# Patient Record
Sex: MALE | URBAN - METROPOLITAN AREA
[De-identification: names, ages, dates, MRNs, and addresses within clinical notes are randomized per-mention and may not be internally consistent; named-entity substitution may affect disease eponyms.]

---

## 2017-01-30 ENCOUNTER — AMBULATORY - HEALTHEAST (OUTPATIENT)
Dept: NEUROSURGERY | Facility: CLINIC | Age: 57
End: 2017-01-30

## 2017-01-30 ENCOUNTER — OFFICE VISIT - HEALTHEAST (OUTPATIENT)
Dept: NEUROSURGERY | Facility: CLINIC | Age: 57
End: 2017-01-30

## 2017-01-30 DIAGNOSIS — G95.9 MYELOPATHY (H): ICD-10-CM

## 2017-01-30 RX ORDER — BACLOFEN 10 MG/1
10 TABLET ORAL 3 TIMES DAILY
Status: SHIPPED | COMMUNITY
Start: 2017-01-30

## 2017-01-30 ASSESSMENT — MIFFLIN-ST. JEOR: SCORE: 2162.51

## 2017-02-16 ENCOUNTER — HOSPITAL ENCOUNTER (OUTPATIENT)
Dept: MRI IMAGING | Facility: CLINIC | Age: 57
Discharge: HOME | End: 2017-02-16
Attending: SURGERY

## 2017-02-16 DIAGNOSIS — G95.9 MYELOPATHY (H): ICD-10-CM

## 2017-03-17 ENCOUNTER — COMMUNICATION - HEALTHEAST (OUTPATIENT)
Dept: NEUROSURGERY | Facility: CLINIC | Age: 57
End: 2017-03-17

## 2017-03-28 ENCOUNTER — OFFICE VISIT - HEALTHEAST (OUTPATIENT)
Dept: NEUROSURGERY | Facility: CLINIC | Age: 57
End: 2017-03-28

## 2017-03-28 DIAGNOSIS — R29.898 WEAKNESS OF BOTH LOWER EXTREMITIES: ICD-10-CM

## 2017-03-28 RX ORDER — GABAPENTIN 600 MG/1
600 TABLET ORAL 3 TIMES DAILY
Status: SHIPPED | COMMUNITY
Start: 2017-03-28

## 2017-04-06 ENCOUNTER — AMBULATORY - HEALTHEAST (OUTPATIENT)
Dept: CARDIOLOGY | Facility: CLINIC | Age: 57
End: 2017-04-06

## 2017-04-06 DIAGNOSIS — M48.061 LUMBAR STENOSIS: ICD-10-CM

## 2017-04-21 ENCOUNTER — HOSPITAL ENCOUNTER (OUTPATIENT)
Dept: MRI IMAGING | Facility: CLINIC | Age: 57
Discharge: HOME | End: 2017-04-21
Attending: NEUROLOGICAL SURGERY

## 2017-04-21 ENCOUNTER — HOSPITAL ENCOUNTER (OUTPATIENT)
Dept: NEUROLOGY | Facility: CLINIC | Age: 57
Discharge: HOME | End: 2017-04-21
Attending: NEUROLOGICAL SURGERY

## 2017-04-21 DIAGNOSIS — M62.81 MUSCLE WEAKNESS (GENERALIZED): ICD-10-CM

## 2017-04-21 DIAGNOSIS — R29.898 WEAKNESS OF BOTH LOWER EXTREMITIES: ICD-10-CM

## 2017-07-05 ENCOUNTER — OFFICE VISIT - HEALTHEAST (OUTPATIENT)
Dept: NEUROSURGERY | Facility: CLINIC | Age: 57
End: 2017-07-05

## 2017-07-05 DIAGNOSIS — M48.061 LUMBAR STENOSIS: ICD-10-CM

## 2017-08-22 ENCOUNTER — OFFICE VISIT - HEALTHEAST (OUTPATIENT)
Dept: NEUROSURGERY | Facility: CLINIC | Age: 57
End: 2017-08-22

## 2017-08-22 DIAGNOSIS — M48.061 LUMBAR STENOSIS: ICD-10-CM

## 2017-09-07 ENCOUNTER — AMBULATORY - HEALTHEAST (OUTPATIENT)
Dept: NEUROSURGERY | Facility: CLINIC | Age: 57
End: 2017-09-07

## 2017-09-07 ENCOUNTER — COMMUNICATION - HEALTHEAST (OUTPATIENT)
Dept: NEUROSURGERY | Facility: CLINIC | Age: 57
End: 2017-09-07

## 2017-09-07 ENCOUNTER — RECORDS - HEALTHEAST (OUTPATIENT)
Dept: ADMINISTRATIVE | Facility: OTHER | Age: 57
End: 2017-09-07

## 2017-09-07 DIAGNOSIS — Z01.818 PRE-OP EVALUATION: ICD-10-CM

## 2017-09-08 ENCOUNTER — RECORDS - HEALTHEAST (OUTPATIENT)
Dept: ADMINISTRATIVE | Facility: OTHER | Age: 57
End: 2017-09-08

## 2017-09-12 ENCOUNTER — ANESTHESIA - HEALTHEAST (OUTPATIENT)
Dept: SURGERY | Facility: CLINIC | Age: 57
End: 2017-09-12

## 2017-09-12 ENCOUNTER — RECORDS - HEALTHEAST (OUTPATIENT)
Dept: ADMINISTRATIVE | Facility: OTHER | Age: 57
End: 2017-09-12

## 2017-09-12 ENCOUNTER — SURGERY - HEALTHEAST (OUTPATIENT)
Dept: SURGERY | Facility: CLINIC | Age: 57
End: 2017-09-12

## 2017-09-12 ASSESSMENT — MIFFLIN-ST. JEOR: SCORE: 2244.15

## 2017-09-15 ENCOUNTER — COMMUNICATION - HEALTHEAST (OUTPATIENT)
Dept: NEUROSURGERY | Facility: CLINIC | Age: 57
End: 2017-09-15

## 2017-10-03 ENCOUNTER — AMBULATORY - HEALTHEAST (OUTPATIENT)
Dept: NEUROSURGERY | Facility: CLINIC | Age: 57
End: 2017-10-03

## 2017-10-03 DIAGNOSIS — Z48.02 REMOVAL OF STAPLES: ICD-10-CM

## 2017-10-24 ENCOUNTER — OFFICE VISIT - HEALTHEAST (OUTPATIENT)
Dept: NEUROSURGERY | Facility: CLINIC | Age: 57
End: 2017-10-24

## 2017-10-24 DIAGNOSIS — M54.16 LUMBAR RADICULOPATHY: ICD-10-CM

## 2017-10-24 DIAGNOSIS — M48.061 SPINAL STENOSIS OF LUMBAR REGION WITHOUT NEUROGENIC CLAUDICATION: ICD-10-CM

## 2017-10-24 RX ORDER — POLYETHYLENE GLYCOL 3350 17 G/17G
17 POWDER, FOR SOLUTION ORAL DAILY
Status: SHIPPED | COMMUNITY
Start: 2017-10-24

## 2017-10-24 ASSESSMENT — MIFFLIN-ST. JEOR: SCORE: 2244.15

## 2021-05-30 VITALS — BODY MASS INDEX: 42.9 KG/M2 | HEIGHT: 70 IN

## 2021-05-30 VITALS — BODY MASS INDEX: 42.8 KG/M2 | HEIGHT: 70 IN | WEIGHT: 299 LBS

## 2021-05-31 VITALS — WEIGHT: 315 LBS | BODY MASS INDEX: 45.1 KG/M2 | HEIGHT: 70 IN

## 2021-06-08 NOTE — PROGRESS NOTES
Patient is here to follow up for post cervical laminoplasty.    He states that he cant stand without support otherwise he collapses.  Radicular Pain:  denies     Motor complaints: weakness in both , right leg worse than the left  Sensory complaints: numbness and tingling in right leg to toes, left leg numbness and tingling left leg to toes        Gait and balance issues: yes   Bowel or bladder issues: denies       Patient has tried the following conservative measures: PT-  Not helping,  Weight loss- lost 9 #s total           INNA today is   2  %  Minna, CMA

## 2021-06-08 NOTE — PROGRESS NOTES
Patient had a cervical laminoplasty.  He complains that he has trouble standing, his legs give out.  Plan is cervical and thoracic MRI.  Rule out cord compression.  Head MRI was okay.  Lumbar MRI shows spinal canal stenosis.  Patient is morbidly obese with a BMI of 43.  When he gets his BMI down to the low 30s he will be a candidate for decompressive lumbar laminectomy.

## 2021-06-09 NOTE — PROGRESS NOTES
Quoc is here to f/u on his bilateral leg weakness. He is s/p SPINE CERVICAL POSTERIOR LAMINOPLASTY RIGHT C3-C7 on 11/1/16 by Dr. Martin. He states his legs were weak prior to that surgery and have not become better. He has new MRI of Cervical, thoracic, and lumbar. He did see Dr. Lam for his lumbar issue but per Dr. Lam, pt needs to loose some weight before being able to be a surgical candidate.   JShowen,CMA

## 2021-06-09 NOTE — PROGRESS NOTES
I saw Quoc Colon in follow-up in my neurosurgery clinic for lower extremity weakness.  Quoc states he's had lower extremity weakness for a number of years and he saw my partner Dr. Lam for lumbar stenosis.  At that time he was told he needs to lose weight for a BMI of less than 35 before Dr. Lam agrees to do a decompressive lumbar laminectomy.  I did review brats imaging of the cervical thoracic spine which shows patency of the cervical spine and thoracic spine with much improvement in the patency of the cervical spine from preop MRI.  On physical exam Quoc can ambulate independently but cannot center on his heels or his toes  Straight leg raise is negative bilaterally  Strength is full throughout the upper and lower extremities  DTRs intact and symmetric throughout the lower extremities    Assessment and plan:  Quoc is a 57-year-old with cervical stenosis status post decompressive laminoplasty and his MRI shows significant improvement and resolution of the cervical canal stenosis.  Guarding his lumbar stenosis, I would recommend EMG study and a repeat MRI to better evaluate and see if this is because of radiculopathy or other underlying causes.  Thank you for giving me the opportunity to see this very pleasant patient.  If you have any questions about him or any othe patient's please feel free to contact me.  Of note I spent greater than 30 minutes counseling the patient regarding his pathology and treatment plan.    Jnenifer Martin

## 2021-06-10 NOTE — PROCEDURES
EMG REPORT     History : Patient is being evaluated for bilateral lower extremity weakness and numbness    Summary: Nerve conduction and EMG study of right lower extremity shows:      Delayed right peroneal distal motor latency with low amplitude and slow conduction velocity    Absent right tibial compound motor action potential    Absent right sural SNAP    Disposable, monopolar needle exam showed positive waves and denervation potentials    Impression:   This is a abnormal nerve conduction and EMG study of lower extremity that suggests mixed sensorimotor polyneuropathy.  This likely is due to patient's underlying diabetes.  Medical correlation is recommended.      Juan Nelson MD  [facility].  Direct Secure Messaging: [email or web address]  Tel: [phone]    This note was dictated using voice recognition software.  Any grammatical or context distortions are unintentional and inherent to the software.

## 2021-06-11 NOTE — PROGRESS NOTES
CHART NOTE     DATE OF SERVICE:  2017     : 1960   57 y.o.     ASSESSMENT :      PLAN: Reschedule to 2017 10:00 am with Dr. Martin.     HPI:  Patient with  history of lumbar stenosis, initially seen by Dr. Jorge L Lam in  and it was recommended that he lose 100 pounds in order to have surgery. In Oct 2016 he presented to ED with a one week history of gait disturbance and leg weakness. MRI sign for severe cervical stenosis and patient had Cervical laminoplasty C3-C7 on 2016 by Dr. Martin.  His symptoms improved although lumbar issues persisted. Plan was to RTC after further weight loss and when he was ready to pursue back decompression    Last seen in clinic on 3/28/2017 by Dr. Martin for his long-standing c/o lower extremity weakness. Review of imaging of the cervical thoracic spine showed patency of the cervical spine and thoracic spine.  On exam patient could ambulate independently but could not center on heels or toes.  Plan was for EMG study and a repeat MRI to better evaluate to determine if radiculopathy or other underlying causes.       TODAY, Quoc Jones was to come in  for review of his lumbar MRI and EMG of BLE.  Received a call from Jodi  at [facility]. Patient does not want to come in today for appt. Will reschedule to  at 10:00 am with Dr. Martin.       PAST MEDICAL HISTORY, SURGICAL HISTORY, REVIEW OF SYMPTOMS, MEDICATIONS AND ALLERGIES:  Past medical history, surgical history, review of symptoms, medications and allergies remain unchanged.    Past Medical History:   Diagnosis Date     Chronic back pain      Diabetes mellitus, type II      Hyperlipidemia      Hypertension      Spinal cord compression     cevical           RADIOGRAPHIC IMAGING: Lumbar MRI:  1.  Marked lumbar spondylosis. 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 Stable moderate to marked L3-L4 and moderate L4-L5 canal narrowing.  2.  Mild to moderate bilateral L5-S1 foraminal narrowing is unchanged. 3.  Lower grade foraminal narrowing elsewhere 4.  Multilevel degenerative disc disease greatest at L5-S1 where it is moderate to marked and multilevel facet arthropathy.    EMG: Abnormal nerve conduction and EMG study of lower extremity that suggests mixed sensorimotor polyneuropathy.  This likely is due to patient's underlying diabetes.      Films personally reviewed.  Reviewed  with patient and family.

## 2021-06-12 NOTE — ANESTHESIA PREPROCEDURE EVALUATION
Anesthesia Evaluation      Patient summary reviewed   No history of anesthetic complications     Airway   Mallampati: III  Neck ROM: full   Pulmonary     breath sounds clear to auscultation  (+) sleep apnea on CPAP, , decreased breath sounds, a smoker (former)                         Cardiovascular - normal exam  Exercise tolerance: > or = 4 METS  (+) hypertension, , hypercholesterolemia,     ECG reviewed        Neuro/Psych    (+) neuromuscular disease (lumbar stenosis with pain and geni LE weakness, uses walker),  chronic pain    Endo/Other    (+) diabetes mellitus, obesity (morbid'),      GI/Hepatic/Renal - negative ROS      Other findings: S/p cervical [id number]      Dental - normal exam                        Anesthesia Plan  Planned anesthetic: general endotracheal  - Patient took lisinopril and metformin this morning. Per surgeon, ok to proceed  - phenylephrine in line  - vasopressin available  - arterial line    ASA 3   Induction: intravenous   Anesthetic plan and risks discussed with: patient  Anesthesia plan special considerations: video-assisted, rapid sequence induction, arterial catheterization,   Post-op plan: routine recovery

## 2021-06-12 NOTE — ANESTHESIA PROCEDURE NOTES
Arterial Line  Reason for Procedure: hemodynamic monitoring  Patient location during procedure: Pre-op  Start time: 9/12/2017 11:24 AM  End time: 9/12/2017 11:37 AM  Staffing:  Performing  Anesthesiologist: WALESKA DA SILVA  Sterile Precautions:  sterile barriers used during insertion: cap, mask, sterile gloves, large sheet, and hand hygiene used.  Arterial Line:   Immediately prior to procedure a time out was called to verify the correct patient, procedure, equipment, support staff and site/side marked as required  Laterality: left  Location: radial  Prepped with: ChloroPrep    Needle gauge: 20 G  Number of Attempts: 1  Secured with: tape  Flushed with: saline  1% lidocaine local anesthesia used for skin prep.   See MAR for additional medications given.  Ultrasound evaluation of access site: yes  Vessel patent by US exam    Concurrent real time visualization of needle entry

## 2021-06-12 NOTE — ANESTHESIA CARE TRANSFER NOTE
Patient spontaneously breathing.  Tidal volumes > 500ml.  Following commands, suctioned and extubated with balloon down.  O2 via mask at 10L.  To PACU, VSS, SBAR report to RN per institutional handoff policy and procedure.  Transfer of care.    Last vitals:   Vitals:    09/12/17 1548   BP: 118/64   Pulse: 100   Resp: 16   Temp: 36.5  C (97.7  F)   SpO2: 96%     Patient's level of consciousness is drowsy  Spontaneous respirations: yes  Maintains airway independently: yes  Dentition unchanged: yes  Oropharynx: oropharynx clear of all foreign objects    QCDR Measures:  ASA# 20 - Surgical Safety Checklist: WHO surgical safety checklist completed prior to induction  PQRS# 430 - Adult PONV Prevention: 4558F - Pt received => 2 anti-emetic agents (different classes) preop & intraop  ASA# 8 - Peds PONV Prevention: NA - Not pediatric patient, not GA or 2 or more risk factors NOT present  PQRS# 424 - Carmen-op Temp Management: 4559F - At least one body temp DOCUMENTED => 35.5C or 95.9F within required timeframe  PQRS# 426 - PACU Transfer Protocol: - Transfer of care checklist used  ASA# 14 - Acute Post-op Pain: ASA14B - Patient did NOT experience pain >= 7 out of 10

## 2021-06-12 NOTE — PROGRESS NOTES
"Neurosurgery consultation was requested by: Dr. Condon  Pain: low back dull ache  Radicular Pain is present: radiates down both legs to feet, R>L  Lhermitte sign: gets \"bolts of lightening\" sharp pain down both legs R>L  Motor complaints: both legs weak, no falls but catches himself before a fall, uses a walker  Sensory complaints: numbness both legs R>L  Gait and balance issues: use walker, balance improved after neck surgery  Bowel or bladder issues: denies   Duration of SX is: 7 years  The symptoms are worse with: standing after bending over  The symptoms are better with: walking for a minute but then the legs give out  Injury: denies  Severity is: severe  Patient has tried the following conservative measures: PT, 1 injection, ice/heat, pain meds   INNA score is: 42%      Ester Guallpa RN  "

## 2021-06-12 NOTE — ANESTHESIA POSTPROCEDURE EVALUATION
Patient: Quoc Jones  BILATERAL LUMBAR 2- LUMBAR 5 DECOMPRESSIVE LAMINECTOMY  Anesthesia type: general    Patient location: PACU  Last vitals:   Vitals:    09/12/17 1630   BP: 117/64   Pulse: 94   Resp: 16   Temp: 36.9  C (98.4  F)   SpO2: 94%     Post vital signs: stable  Level of consciousness: awake and responds to simple questions  Post-anesthesia pain: pain controlled  Post-anesthesia nausea and vomiting: no  Pulmonary: unassisted, return to baseline  Cardiovascular: stable and blood pressure at baseline  Hydration: adequate  Anesthetic events: no    QCDR Measures:  ASA# 11 - Carmen-op Cardiac Arrest: ASA11B - Patient did NOT experience unanticipated cardiac arrest  ASA# 12 - Carmen-op Mortality Rate: ASA12B - Patient did NOT die  ASA# 13 - PACU Re-Intubation Rate: ASA13B - Patient did NOT require a new airway mgmt  ASA# 10 - Composite Anes Safety: ASA10A - No serious adverse event    Additional Notes:

## 2021-06-12 NOTE — PROGRESS NOTES
I had the pleasure of seeing Quoc Jones in my clinic to discuss lumbar laminctomy. He is having progressive difficulty and weakness in his lower extremities. He denies bowel or bladder incontinence. We will plan on L2-L5 decompressive laminectomy in the near future. The risks, benefits, alternatives of the procedure were explained to him in detail and all questions were answered.  Thank you for giving me the opportunity to see this very pleasant patient.  If you have any questions about him or any othe patient's please feel free to contact me.  Of note I spent greater than 30 minutes counseling the patient regarding his pathology and treatment plan.      Jennifer Martin MD, FAANS

## 2021-06-13 NOTE — PROGRESS NOTES
"CHART NOTE     DATE OF SERVICE:  10/24/2017     : 1960   57 y.o.     ASSESSMENT :   Doing well with improvement in symptoms and function.       PLAN:  Loosen restrictions. Refer to PT. RTC prn. Enc to call with any new questions or concerns.     HPI:  Quoc Jones is status post BILATERAL LUMBAR 2- LUMBAR 5 DECOMPRESSIVE LAMINECTOMY on 17 by Dr. Martin. Presented with BLE weakness, sense of heaviness.      TODAY, Quoc Jones reports legs feel somewhat better now as of about a week ago.  Residual Numbness in feet, R>L.  Has been moved to a facility where he now has access to gym.       PAST MEDICAL HISTORY, SURGICAL HISTORY, REVIEW OF SYMPTOMS, MEDICATIONS AND ALLERGIES:  Past medical history, surgical history, ROS, medications and allergies reviewed with patient and remain unchanged from previous visit.    Past Medical History:   Diagnosis Date     Chronic back pain      Diabetes mellitus, type II      Fatty liver      Hyperlipidemia      Hypertension      Neuropathy      Sleep apnea     Bipap     Spinal cord compression     cevical     Ventral hernia        PHYSICAL EXAM:    /62  Pulse 87  Ht 5' 10\" (1.778 m)  Wt (!) 317 lb (143.8 kg)  BMI 45.48 kg/m2    Neurological exam reveals:  Respirations easy, non-labored.   Skin: W/D/I. No rashes, lesions or breaks in integrity.   Recent and remote memory intact, fund of knowledge wnl.    Alert and oriented x3, speech fluent and appropriate.   PERRL, EOMI, No nystagmus,   Face symmetric, tongue midline, Uvula midline,  palate rises with phonation   Shoulder shrug equal  Leg strength bilateral dorsiflexion, plantar flexion, and hip flexion 5/5 to exam  No extremity edema noted.   Muscle Bulk and tone wnl.   Reflexes: No pathological reflexes   Gait and station:Normal  Incision: CDI without erythema or edema     RADIOGRAPHIC IMAGING:  NA          "

## 2021-06-15 PROBLEM — E11.42 DIABETIC POLYNEUROPATHY ASSOCIATED WITH TYPE 2 DIABETES MELLITUS (H): Status: ACTIVE | Noted: 2017-04-21

## 2021-06-16 PROBLEM — E66.01 MORBID OBESITY WITH BMI OF 45.0-49.9, ADULT (H): Status: ACTIVE | Noted: 2017-09-14

## 2021-06-16 PROBLEM — M54.16 LUMBAR RADICULOPATHY: Status: ACTIVE | Noted: 2017-09-14

## 2022-08-24 DIAGNOSIS — M54.16 LUMBAR RADICULOPATHY: Primary | ICD-10-CM

## 2022-08-31 ENCOUNTER — TELEPHONE (OUTPATIENT)
Dept: NEUROSURGERY | Facility: CLINIC | Age: 62
End: 2022-08-31

## 2022-09-14 ENCOUNTER — TELEPHONE (OUTPATIENT)
Dept: NEUROSURGERY | Facility: CLINIC | Age: 62
End: 2022-09-14

## 2022-09-14 NOTE — TELEPHONE ENCOUNTER
09/14/22- Call rec'd from Vilma at [facility], pt is being seen outside of [facility] and will no longer need an appt